# Patient Record
Sex: MALE | Race: WHITE | NOT HISPANIC OR LATINO | Employment: OTHER
[De-identification: names, ages, dates, MRNs, and addresses within clinical notes are randomized per-mention and may not be internally consistent; named-entity substitution may affect disease eponyms.]

---

## 2024-04-11 ENCOUNTER — TRANSCRIBE ORDERS (OUTPATIENT)
Dept: OTHER | Age: 74
End: 2024-04-11

## 2024-04-11 DIAGNOSIS — N40.1 BENIGN PROSTATIC HYPERPLASIA WITH LOWER URINARY TRACT SYMPTOMS: Primary | ICD-10-CM

## 2024-05-07 ENCOUNTER — OFFICE VISIT (OUTPATIENT)
Dept: UROLOGY | Facility: CLINIC | Age: 74
End: 2024-05-07
Payer: COMMERCIAL

## 2024-05-07 VITALS
BODY MASS INDEX: 39.2 KG/M2 | OXYGEN SATURATION: 94 % | HEIGHT: 71 IN | DIASTOLIC BLOOD PRESSURE: 100 MMHG | SYSTOLIC BLOOD PRESSURE: 146 MMHG | WEIGHT: 280 LBS | HEART RATE: 83 BPM

## 2024-05-07 DIAGNOSIS — N13.8 BPH WITH OBSTRUCTION/LOWER URINARY TRACT SYMPTOMS: ICD-10-CM

## 2024-05-07 DIAGNOSIS — N40.1 BPH WITH OBSTRUCTION/LOWER URINARY TRACT SYMPTOMS: ICD-10-CM

## 2024-05-07 DIAGNOSIS — N40.1 BENIGN PROSTATIC HYPERPLASIA WITH LOWER URINARY TRACT SYMPTOMS, SYMPTOM DETAILS UNSPECIFIED: Primary | ICD-10-CM

## 2024-05-07 LAB — RESIDUAL VOLUME (RV) (EXTERNAL): 32

## 2024-05-07 PROCEDURE — 87088 URINE BACTERIA CULTURE: CPT | Performed by: UROLOGY

## 2024-05-07 PROCEDURE — 51798 US URINE CAPACITY MEASURE: CPT | Performed by: UROLOGY

## 2024-05-07 PROCEDURE — 87086 URINE CULTURE/COLONY COUNT: CPT | Performed by: UROLOGY

## 2024-05-07 PROCEDURE — 99204 OFFICE O/P NEW MOD 45 MIN: CPT | Mod: 25 | Performed by: UROLOGY

## 2024-05-07 RX ORDER — OXYBUTYNIN CHLORIDE 5 MG/1
5 TABLET ORAL
COMMUNITY
Start: 2023-05-25 | End: 2024-05-07

## 2024-05-07 RX ORDER — ROPINIROLE 0.5 MG/1
0.5 TABLET, FILM COATED ORAL
COMMUNITY
Start: 2023-05-18

## 2024-05-07 RX ORDER — TERAZOSIN 10 MG/1
10 CAPSULE ORAL
COMMUNITY
Start: 2023-05-18 | End: 2024-05-07

## 2024-05-07 RX ORDER — LEVOTHYROXINE SODIUM 112 UG/1
224 TABLET ORAL
COMMUNITY
Start: 2024-04-11

## 2024-05-07 RX ORDER — METHOCARBAMOL 750 MG/1
750 TABLET, FILM COATED ORAL
COMMUNITY
Start: 2024-01-05 | End: 2024-05-07

## 2024-05-07 RX ORDER — ACETAMINOPHEN 500 MG
1000 TABLET ORAL
COMMUNITY
Start: 2024-04-13

## 2024-05-07 NOTE — NURSING NOTE
Chief Complaint   Patient presents with    Benign Prostatic Hypertrophy     AND LUTS    Patients post void residual was 32 ml today.  Berenice George LPN

## 2024-05-07 NOTE — LETTER
5/7/2024       RE: Stalin Fleming  [address] [facility] [address] [facility] [id number]     Dear Colleague,    Thank you for referring your patient, Stalin Fleming, to the [facility] at [facility]. Please see a copy of my visit note below.    Name: Stalin Fleming   MRN: [id number]  YOB: 1950    Assessment & Plan    Assessment and Plan:  73 year old male with BPH and bothersome lower urinary tract symptoms.     1. Benign prostatic hyperplasia with lower urinary tract symptoms  2. Bladder diverticulum    Discussed BPH treatment options in general are, including medications   Minimally invasive surgical techniques, PAE and HoLEP.  With each of these, discussed benefits, side effects, treatment/retreatment rates with regard to patients prostate symptoms and size.  For him specifically, he would be a candidate for PAE or HoLEP.    Discussed risks of HoLEP, including, but not limited to bleeding, infection, retrograde ejaculation, short/long term incontinence risks, delayed resolution of LUTS and injury to urethra and bladder.  Also discussed hospital stay and follow up.    Discussed using botulinum toxin to help with postoperative urgency and urge incontinence.  Discussed small risk of prolonged urinary retention and slight increase risk of UTI.    Discussed bladder diverticulum.  Based on size, there is a 90% chance that this will not require further management after outlet procedure.  Will observe this going forward.     Plan:  - 120 min Holep with botox with overnight stay  - speciated urine culture  - preop anesthesia risk assessment    30 minutes spent on day of encounter with patient, counseling, documenation, orders and coordination of care.      Efe Kumar MD  May 7, 2024          History of Present Illness      Chief Complaint: BPH and urinary retention     History of Present Illness:  Stalin Fleming is a 73 year old male seen in  consultation from [facility] regarding BPH and urinary retention.      Patient has had symptoms for about 5 years.  Currently, they are voiding spontaneously. They are currently taking an alpha blocker, not taking 5-alpha reductase inhibitor, not taking bladder spasm medication, not taking PDE5 inhibitor for their urination.  Patient is not anticoagulated.    Has some urge urinary incontinence.    Tried sacral nerve stimulator a year ago but did not have success.    Prostate size 130    Outside cystoscopy on February 5, 2024 with trilobar hypertrophy with an intravesical protruding median lobe, Large bladder diverticuli seen, Significant trabeculation    BPH related history:  -- Urinary tract infections  -- Urinary tract stones  -- Gross hematuria  + Elevated PSA  -- History of Urinary retention  + Chronic kidney disease  -- Prior BPH procedure  + Prior Prostate biopsy negative biopsy December 2020  -- History of prostate cancer  -- Family history of BPH    I reviewed external records which in summarized above.    Results      I reviewed internal labs, of which pertinent ones include:   PSA on December 21, 2023 7.55  Creatinine October 26, 2023 0.7  Hemoglobin A1c March 29, 2024 6.3  Platelets 167  Hematocrit 46.8    Cystogram January 2024 demonstrates 4 cm right bladder wall diverticulum with a greater than 1 cm diverticular neck diameter         Past Medical History:  Type 2 diabetes mellitus  COPD  Obesity  Hyperlipidemia  Restless leg  Thrombocytopenia  History of papillary thyroid cancer  Prior smoker  Nephrolithiasis  Elevated PSA  Lung nodules         Past Surgical History:  Past Surgical History:   Procedure Laterality Date    CYSTOSCOPY     Subtotal thyroidectomy  Right upper lobe VATS wedge resection 2017         Social History:  Social History     Tobacco Use    Smoking status: Every Day     Types: Cigarettes    Smokeless tobacco: Never   Substance Use Topics    Alcohol use: Not Currently    Drug use:  "Never            Family History:  History reviewed. No pertinent family history.         Allergies:  No Known Allergies         Medications:  Current Outpatient Medications   Medication Sig Dispense Refill    acetaminophen (TYLENOL) 500 MG tablet 1,000 mg      levothyroxine (SYNTHROID) 112 MCG tablet 224 mcg      rOPINIRole (REQUIP) 0.5 MG tablet 0.5 mg       No current facility-administered medications for this visit.       Review of Systems:   ROS: 10 point ROS neg other than the symptoms noted above in the HPI.    Physical Exam      Physical Exam:  B/P: 146/100, T: Data Unavailable, P: 83, R: Data Unavailable  Estimated body mass index is 39.05 kg/m  as calculated from the following:    Height as of this encounter: 1.803 m (5' 11\").    Weight as of this encounter: 127 kg (280 lb).  General: age-appropriate appearing male in NAD.    Outside records:   I spent 15 minutes reviewing outside records.    "

## 2024-05-07 NOTE — PATIENT INSTRUCTIONS
For holmium laser enucleation of the prostate (HoLEP), this uses a laser to help physically remove the inner prostate tissue from the shell.  This has approximately an 99% success rate after the procedure and a 1-2% retreatment rate in 5 years.  There is a high chance of short term incontinence for 4-6 weeks that may require pads or diapers.  At 3 months, approximately 5% of men are still having leakage and at 6 months and beyond it is 1-2%.  This procedure will take 1-3 hours under general anesthesia depending on the size of the prostate.  Patients can possibly go home the same day without a catheter or stay the night in the hospital if they prefer. Patients should expect to have retrograde ejaculation after surgery (no semen coming out of the end of the penis).    After surgery you should avoid any heavy lifting (more than 20 pounds) for 2 weeks.  You should also avoid sexual activity and any activities that sit on a seat or saddle (like a bike, lawnmower, ATV, motorcycle) for 2 weeks.  Some experience pain with urination for up to a week or 2.  It is normal to have blood in the urine for up to 4 weeks.  I typically see patients back 3 months after surgery with repeat PSA.    You will receive a call from my  within the next 1 to 2 weeks in order to get the procedure scheduled.  You will need a urine culture performed at least 2 weeks prior to surgery and we will prescribe antibiotics if necessary.  You will also need a preoperative physical from her primary care provider or through [facility] at least a week before surgery.  You do not need to hold baby aspirin prior to the surgery, and you will receive instructions on other blood thinners and instructions to hold those as needed.

## 2024-05-07 NOTE — PROGRESS NOTES
Name: Stalin Fleming   MRN: [id number]  YOB: 1950    Assessment & Plan    Assessment and Plan:  73 year old male with BPH and bothersome lower urinary tract symptoms.     1. Benign prostatic hyperplasia with lower urinary tract symptoms  2. Bladder diverticulum    Discussed BPH treatment options in general are, including medications   Minimally invasive surgical techniques, PAE and HoLEP.  With each of these, discussed benefits, side effects, treatment/retreatment rates with regard to patients prostate symptoms and size.  For him specifically, he would be a candidate for PAE or HoLEP.    Discussed risks of HoLEP, including, but not limited to bleeding, infection, retrograde ejaculation, short/long term incontinence risks, delayed resolution of LUTS and injury to urethra and bladder.  Also discussed hospital stay and follow up.    Discussed using botulinum toxin to help with postoperative urgency and urge incontinence.  Discussed small risk of prolonged urinary retention and slight increase risk of UTI.    Discussed bladder diverticulum.  Based on size, there is a 90% chance that this will not require further management after outlet procedure.  Will observe this going forward.     Plan:  - 120 min Holep with botox with overnight stay  - speciated urine culture  - preop anesthesia risk assessment    30 minutes spent on day of encounter with patient, counseling, documenation, orders and coordination of care.      Efe Kumar MD  May 7, 2024          History of Present Illness      Chief Complaint: BPH and urinary retention     History of Present Illness:  Stalin Fleming is a 73 year old male seen in consultation from [facility] regarding BPH and urinary retention.      Patient has had symptoms for about 5 years.  Currently, they are voiding spontaneously. They are currently taking an alpha blocker, not taking 5-alpha reductase inhibitor, not taking bladder spasm medication, not taking PDE5  inhibitor for their urination.  Patient is not anticoagulated.    Has some urge urinary incontinence.    Tried sacral nerve stimulator a year ago but did not have success.    Prostate size 130    Outside cystoscopy on February 5, 2024 with trilobar hypertrophy with an intravesical protruding median lobe, Large bladder diverticuli seen, Significant trabeculation    BPH related history:  -- Urinary tract infections  -- Urinary tract stones  -- Gross hematuria  + Elevated PSA  -- History of Urinary retention  + Chronic kidney disease  -- Prior BPH procedure  + Prior Prostate biopsy negative biopsy December 2020  -- History of prostate cancer  -- Family history of BPH    I reviewed external records which in summarized above.    Results      I reviewed internal labs, of which pertinent ones include:   PSA on December 21, 2023 7.55  Creatinine October 26, 2023 0.7  Hemoglobin A1c March 29, 2024 6.3  Platelets 167  Hematocrit 46.8    Cystogram January 2024 demonstrates 4 cm right bladder wall diverticulum with a greater than 1 cm diverticular neck diameter         Past Medical History:  Type 2 diabetes mellitus  COPD  Obesity  Hyperlipidemia  Restless leg  Thrombocytopenia  History of papillary thyroid cancer  Prior smoker  Nephrolithiasis  Elevated PSA  Lung nodules         Past Surgical History:  Past Surgical History:   Procedure Laterality Date    CYSTOSCOPY     Subtotal thyroidectomy  Right upper lobe VATS wedge resection 2017         Social History:  Social History     Tobacco Use    Smoking status: Every Day     Types: Cigarettes    Smokeless tobacco: Never   Substance Use Topics    Alcohol use: Not Currently    Drug use: Never            Family History:  History reviewed. No pertinent family history.         Allergies:  No Known Allergies         Medications:  Current Outpatient Medications   Medication Sig Dispense Refill    acetaminophen (TYLENOL) 500 MG tablet 1,000 mg      levothyroxine (SYNTHROID) 112 MCG  "tablet 224 mcg      rOPINIRole (REQUIP) 0.5 MG tablet 0.5 mg       No current facility-administered medications for this visit.       Review of Systems:   ROS: 10 point ROS neg other than the symptoms noted above in the HPI.    Physical Exam      Physical Exam:  B/P: 146/100, T: Data Unavailable, P: 83, R: Data Unavailable  Estimated body mass index is 39.05 kg/m  as calculated from the following:    Height as of this encounter: 1.803 m (5' 11\").    Weight as of this encounter: 127 kg (280 lb).  General: age-appropriate appearing male in NAD.    Outside records:   I spent 15 minutes reviewing outside records.      "

## 2024-05-08 ENCOUNTER — TELEPHONE (OUTPATIENT)
Dept: UROLOGY | Facility: CLINIC | Age: 74
End: 2024-05-08

## 2024-05-08 NOTE — TELEPHONE ENCOUNTER
DUNG    Spoke with: Patient       Date of surgery: Wednesday June 5 2024 with Dr Kumar       Location: [facility]       Informed patient they will need a adult : 23 hr obs      Pre op with provider: Patient will schedule with [facility]      H&P Scheduled in PAC- NA        Pre procedure covid :Not req      Additional imaging: NA        Surgery Packet :mailed to patient      Additional comments: Please call for surgery teaching

## 2024-05-09 LAB
BACTERIA UR CULT: ABNORMAL
BACTERIA UR CULT: ABNORMAL

## 2024-05-13 ENCOUNTER — PATIENT OUTREACH (OUTPATIENT)
Dept: UROLOGY | Facility: CLINIC | Age: 74
End: 2024-05-13

## 2024-05-13 DIAGNOSIS — N39.0 URINARY TRACT INFECTION: Primary | ICD-10-CM

## 2024-05-13 RX ORDER — CEPHALEXIN 500 MG/1
500 CAPSULE ORAL 2 TIMES DAILY
Qty: 28 CAPSULE | Refills: 0 | Status: SHIPPED | OUTPATIENT
Start: 2024-05-13 | End: 2024-05-27

## 2024-05-13 NOTE — PROGRESS NOTES
RNCC called pt and lm with call back for pharmacy for abx and surgery teaching.  Pt will need cephalexin 500 mg PO BID x 7 day before/after HoLEP   GERSON Salgado  Care Coordinator Urology  [phone]

## 2024-05-13 NOTE — PROGRESS NOTES
Procedure:   Holmium laser nucleation of the prostate and N/A General   cystoscopic injection of 100 units of Botox         Date: 06/050/2024  Provider: Stacy     Post op appt: tbd    [facility]  UA/UC: completed and tx sent    Medications: yes  Soap: yes  Reviewed when to start clear liquids and when to start NPO: yes  : yes  24 hour observation: yes    Pt or family member expressed understanding: yes    Naomi Wood RN  5/13/2024  3:36 PM

## 2024-06-03 RX ORDER — LOSARTAN POTASSIUM 50 MG/1
1 TABLET ORAL EVERY EVENING
COMMUNITY
Start: 2024-05-17

## 2024-06-04 RX ORDER — METHOCARBAMOL 750 MG/1
750 TABLET, FILM COATED ORAL 3 TIMES DAILY
COMMUNITY

## 2024-06-04 RX ORDER — OXYBUTYNIN CHLORIDE 5 MG/1
5 TABLET ORAL 2 TIMES DAILY
COMMUNITY

## 2024-06-04 RX ORDER — TERAZOSIN 10 MG/1
10 CAPSULE ORAL 2 TIMES DAILY
COMMUNITY

## 2024-06-04 RX ORDER — DULOXETIN HYDROCHLORIDE 30 MG/1
30 CAPSULE, DELAYED RELEASE ORAL DAILY
COMMUNITY

## 2024-06-04 RX ORDER — BUPROPION HYDROCHLORIDE 150 MG/1
150 TABLET, EXTENDED RELEASE ORAL 2 TIMES DAILY
COMMUNITY
Start: 2024-05-17

## 2024-06-05 ENCOUNTER — ANESTHESIA (OUTPATIENT)
Dept: SURGERY | Facility: CLINIC | Age: 74
End: 2024-06-05
Payer: COMMERCIAL

## 2024-06-05 ENCOUNTER — ANESTHESIA EVENT (OUTPATIENT)
Dept: SURGERY | Facility: CLINIC | Age: 74
End: 2024-06-05
Payer: COMMERCIAL

## 2024-06-05 ENCOUNTER — HOSPITAL ENCOUNTER (OUTPATIENT)
Facility: CLINIC | Age: 74
Discharge: HOME | End: 2024-06-06
Attending: UROLOGY | Admitting: UROLOGY
Payer: COMMERCIAL

## 2024-06-05 DIAGNOSIS — N40.0 BENIGN PROSTATIC HYPERPLASIA, UNSPECIFIED WHETHER LOWER URINARY TRACT SYMPTOMS PRESENT: Primary | ICD-10-CM

## 2024-06-05 LAB
ATRIAL RATE - MUSE: 86 BPM
CREAT SERPL-MCNC: 0.68 MG/DL (ref 0.67–1.17)
DIASTOLIC BLOOD PRESSURE - MUSE: NORMAL MMHG
EGFRCR SERPLBLD CKD-EPI 2021: >90 ML/MIN/1.73M2
GLUCOSE BLDC GLUCOMTR-MCNC: 128 MG/DL (ref 70–99)
INTERPRETATION ECG - MUSE: NORMAL
P AXIS - MUSE: 76 DEGREES
PR INTERVAL - MUSE: 170 MS
QRS DURATION - MUSE: 100 MS
QT - MUSE: 406 MS
QTC - MUSE: 485 MS
R AXIS - MUSE: 30 DEGREES
SYSTOLIC BLOOD PRESSURE - MUSE: NORMAL MMHG
T AXIS - MUSE: 32 DEGREES
VENTRICULAR RATE- MUSE: 86 BPM

## 2024-06-05 PROCEDURE — 250N000009 HC RX 250: Performed by: NURSE ANESTHETIST, CERTIFIED REGISTERED

## 2024-06-05 PROCEDURE — 94640 AIRWAY INHALATION TREATMENT: CPT

## 2024-06-05 PROCEDURE — 272N000001 HC OR GENERAL SUPPLY STERILE: Performed by: UROLOGY

## 2024-06-05 PROCEDURE — C1758 CATHETER, URETERAL: HCPCS | Performed by: UROLOGY

## 2024-06-05 PROCEDURE — 52287 CYSTOSCOPY CHEMODENERVATION: CPT | Mod: 59 | Performed by: UROLOGY

## 2024-06-05 PROCEDURE — 999N000141 HC STATISTIC PRE-PROCEDURE NURSING ASSESSMENT: Performed by: UROLOGY

## 2024-06-05 PROCEDURE — 88305 TISSUE EXAM BY PATHOLOGIST: CPT | Mod: 26 | Performed by: PATHOLOGY

## 2024-06-05 PROCEDURE — 258N000001 HC RX 258

## 2024-06-05 PROCEDURE — 250N000025 HC SEVOFLURANE, PER MIN: Performed by: UROLOGY

## 2024-06-05 PROCEDURE — 250N000011 HC RX IP 250 OP 636

## 2024-06-05 PROCEDURE — 999N000157 HC STATISTIC RCP TIME EA 10 MIN

## 2024-06-05 PROCEDURE — 99100 ANES PT EXTEME AGE<1 YR&>70: CPT | Performed by: NURSE ANESTHETIST, CERTIFIED REGISTERED

## 2024-06-05 PROCEDURE — 250N000011 HC RX IP 250 OP 636: Performed by: UROLOGY

## 2024-06-05 PROCEDURE — 250N000009 HC RX 250

## 2024-06-05 PROCEDURE — 999N000054 HC STATISTIC EKG NON-CHARGEABLE

## 2024-06-05 PROCEDURE — 258N000003 HC RX IP 258 OP 636: Performed by: UROLOGY

## 2024-06-05 PROCEDURE — 82962 GLUCOSE BLOOD TEST: CPT

## 2024-06-05 PROCEDURE — 258N000003 HC RX IP 258 OP 636: Performed by: NURSE ANESTHETIST, CERTIFIED REGISTERED

## 2024-06-05 PROCEDURE — 250N000013 HC RX MED GY IP 250 OP 250 PS 637: Performed by: NURSE ANESTHETIST, CERTIFIED REGISTERED

## 2024-06-05 PROCEDURE — 250N000011 HC RX IP 250 OP 636: Performed by: NURSE ANESTHETIST, CERTIFIED REGISTERED

## 2024-06-05 PROCEDURE — 258N000003 HC RX IP 258 OP 636

## 2024-06-05 PROCEDURE — 360N000077 HC SURGERY LEVEL 4, PER MIN: Performed by: UROLOGY

## 2024-06-05 PROCEDURE — 88305 TISSUE EXAM BY PATHOLOGIST: CPT | Mod: TC | Performed by: UROLOGY

## 2024-06-05 PROCEDURE — 52649 PROSTATE LASER ENUCLEATION: CPT | Mod: 22 | Performed by: UROLOGY

## 2024-06-05 PROCEDURE — 250N000013 HC RX MED GY IP 250 OP 250 PS 637

## 2024-06-05 PROCEDURE — 370N000017 HC ANESTHESIA TECHNICAL FEE, PER MIN: Performed by: UROLOGY

## 2024-06-05 PROCEDURE — 99100 ANES PT EXTEME AGE<1 YR&>70: CPT | Performed by: ANESTHESIOLOGY

## 2024-06-05 PROCEDURE — 52649 PROSTATE LASER ENUCLEATION: CPT | Performed by: NURSE ANESTHETIST, CERTIFIED REGISTERED

## 2024-06-05 PROCEDURE — 82565 ASSAY OF CREATININE: CPT | Performed by: UROLOGY

## 2024-06-05 PROCEDURE — 272N000002 HC OR SUPPLY OTHER OPNP: Performed by: UROLOGY

## 2024-06-05 PROCEDURE — 250N000020 HC RX IP 250 OP 636 J0585: Mod: JZ | Performed by: UROLOGY

## 2024-06-05 PROCEDURE — 250N000009 HC RX 250: Performed by: ANESTHESIOLOGY

## 2024-06-05 PROCEDURE — 36415 COLL VENOUS BLD VENIPUNCTURE: CPT | Performed by: UROLOGY

## 2024-06-05 PROCEDURE — 710N000010 HC RECOVERY PHASE 1, LEVEL 2, PER MIN: Performed by: UROLOGY

## 2024-06-05 PROCEDURE — 52649 PROSTATE LASER ENUCLEATION: CPT | Performed by: ANESTHESIOLOGY

## 2024-06-05 RX ORDER — LOSARTAN POTASSIUM 50 MG/1
50 TABLET ORAL EVERY EVENING
Status: DISCONTINUED | OUTPATIENT
Start: 2024-06-05 | End: 2024-06-06 | Stop reason: HOSPADM

## 2024-06-05 RX ORDER — AMOXICILLIN 250 MG
1 CAPSULE ORAL 2 TIMES DAILY
Status: DISCONTINUED | OUTPATIENT
Start: 2024-06-05 | End: 2024-06-06 | Stop reason: HOSPADM

## 2024-06-05 RX ORDER — LIDOCAINE HYDROCHLORIDE 20 MG/ML
INJECTION, SOLUTION INFILTRATION; PERINEURAL PRN
Status: DISCONTINUED | OUTPATIENT
Start: 2024-06-05 | End: 2024-06-05

## 2024-06-05 RX ORDER — FUROSEMIDE 10 MG/ML
20 INJECTION INTRAMUSCULAR; INTRAVENOUS ONCE
Status: DISCONTINUED | OUTPATIENT
Start: 2024-06-05 | End: 2024-06-06 | Stop reason: HOSPADM

## 2024-06-05 RX ORDER — FENTANYL CITRATE 50 UG/ML
25 INJECTION, SOLUTION INTRAMUSCULAR; INTRAVENOUS EVERY 5 MIN PRN
Status: DISCONTINUED | OUTPATIENT
Start: 2024-06-05 | End: 2024-06-05 | Stop reason: HOSPADM

## 2024-06-05 RX ORDER — EPHEDRINE SULFATE 50 MG/ML
INJECTION, SOLUTION INTRAMUSCULAR; INTRAVENOUS; SUBCUTANEOUS PRN
Status: DISCONTINUED | OUTPATIENT
Start: 2024-06-05 | End: 2024-06-05

## 2024-06-05 RX ORDER — NALOXONE HYDROCHLORIDE 0.4 MG/ML
0.2 INJECTION, SOLUTION INTRAMUSCULAR; INTRAVENOUS; SUBCUTANEOUS
Status: DISCONTINUED | OUTPATIENT
Start: 2024-06-05 | End: 2024-06-06 | Stop reason: HOSPADM

## 2024-06-05 RX ORDER — ROPINIROLE 0.25 MG/1
0.5 TABLET, FILM COATED ORAL 3 TIMES DAILY
Status: DISCONTINUED | OUTPATIENT
Start: 2024-06-05 | End: 2024-06-06 | Stop reason: HOSPADM

## 2024-06-05 RX ORDER — HYDROMORPHONE HYDROCHLORIDE 1 MG/ML
0.2 INJECTION, SOLUTION INTRAMUSCULAR; INTRAVENOUS; SUBCUTANEOUS
Status: DISCONTINUED | OUTPATIENT
Start: 2024-06-05 | End: 2024-06-06 | Stop reason: HOSPADM

## 2024-06-05 RX ORDER — AMPICILLIN 2 G/1
2 INJECTION, POWDER, FOR SOLUTION INTRAVENOUS EVERY 6 HOURS
Status: DISCONTINUED | OUTPATIENT
Start: 2024-06-05 | End: 2024-06-06 | Stop reason: HOSPADM

## 2024-06-05 RX ORDER — NALOXONE HYDROCHLORIDE 0.4 MG/ML
0.4 INJECTION, SOLUTION INTRAMUSCULAR; INTRAVENOUS; SUBCUTANEOUS
Status: DISCONTINUED | OUTPATIENT
Start: 2024-06-05 | End: 2024-06-06 | Stop reason: HOSPADM

## 2024-06-05 RX ORDER — SODIUM CHLORIDE, SODIUM LACTATE, POTASSIUM CHLORIDE, CALCIUM CHLORIDE 600; 310; 30; 20 MG/100ML; MG/100ML; MG/100ML; MG/100ML
INJECTION, SOLUTION INTRAVENOUS CONTINUOUS PRN
Status: DISCONTINUED | OUTPATIENT
Start: 2024-06-05 | End: 2024-06-05

## 2024-06-05 RX ORDER — POLYETHYLENE GLYCOL 3350 17 G/17G
17 POWDER, FOR SOLUTION ORAL DAILY
Status: DISCONTINUED | OUTPATIENT
Start: 2024-06-06 | End: 2024-06-06 | Stop reason: HOSPADM

## 2024-06-05 RX ORDER — TAMSULOSIN HYDROCHLORIDE 0.4 MG/1
0.4 CAPSULE ORAL DAILY
Status: DISCONTINUED | OUTPATIENT
Start: 2024-06-05 | End: 2024-06-06 | Stop reason: HOSPADM

## 2024-06-05 RX ORDER — DEXAMETHASONE SODIUM PHOSPHATE 4 MG/ML
4 INJECTION, SOLUTION INTRA-ARTICULAR; INTRALESIONAL; INTRAMUSCULAR; INTRAVENOUS; SOFT TISSUE
Status: DISCONTINUED | OUTPATIENT
Start: 2024-06-05 | End: 2024-06-05 | Stop reason: HOSPADM

## 2024-06-05 RX ORDER — ONDANSETRON 4 MG/1
4 TABLET, ORALLY DISINTEGRATING ORAL EVERY 6 HOURS PRN
Status: DISCONTINUED | OUTPATIENT
Start: 2024-06-05 | End: 2024-06-06 | Stop reason: HOSPADM

## 2024-06-05 RX ORDER — SODIUM CHLORIDE 9 MG/ML
INJECTION, SOLUTION INTRAVENOUS CONTINUOUS
Status: ACTIVE | OUTPATIENT
Start: 2024-06-05 | End: 2024-06-05

## 2024-06-05 RX ORDER — HYDROMORPHONE HYDROCHLORIDE 1 MG/ML
0.4 INJECTION, SOLUTION INTRAMUSCULAR; INTRAVENOUS; SUBCUTANEOUS
Status: DISCONTINUED | OUTPATIENT
Start: 2024-06-05 | End: 2024-06-06 | Stop reason: HOSPADM

## 2024-06-05 RX ORDER — OXYCODONE HYDROCHLORIDE 5 MG/1
10 TABLET ORAL EVERY 4 HOURS PRN
Status: DISCONTINUED | OUTPATIENT
Start: 2024-06-05 | End: 2024-06-06 | Stop reason: HOSPADM

## 2024-06-05 RX ORDER — PROCHLORPERAZINE MALEATE 5 MG
5 TABLET ORAL EVERY 6 HOURS PRN
Status: DISCONTINUED | OUTPATIENT
Start: 2024-06-05 | End: 2024-06-06 | Stop reason: HOSPADM

## 2024-06-05 RX ORDER — METHOCARBAMOL 750 MG/1
750 TABLET, FILM COATED ORAL 3 TIMES DAILY
Status: DISCONTINUED | OUTPATIENT
Start: 2024-06-05 | End: 2024-06-06 | Stop reason: HOSPADM

## 2024-06-05 RX ORDER — AMPICILLIN 2 G/1
2 INJECTION, POWDER, FOR SOLUTION INTRAVENOUS ONCE
Status: COMPLETED | OUTPATIENT
Start: 2024-06-05 | End: 2024-06-05

## 2024-06-05 RX ORDER — LIDOCAINE 40 MG/G
CREAM TOPICAL
Status: DISCONTINUED | OUTPATIENT
Start: 2024-06-05 | End: 2024-06-06 | Stop reason: HOSPADM

## 2024-06-05 RX ORDER — GINSENG 100 MG
CAPSULE ORAL 3 TIMES DAILY
Status: DISCONTINUED | OUTPATIENT
Start: 2024-06-05 | End: 2024-06-06 | Stop reason: HOSPADM

## 2024-06-05 RX ORDER — IPRATROPIUM BROMIDE AND ALBUTEROL SULFATE 2.5; .5 MG/3ML; MG/3ML
3 SOLUTION RESPIRATORY (INHALATION)
Status: DISCONTINUED | OUTPATIENT
Start: 2024-06-05 | End: 2024-06-06 | Stop reason: HOSPADM

## 2024-06-05 RX ORDER — NALOXONE HYDROCHLORIDE 0.4 MG/ML
0.1 INJECTION, SOLUTION INTRAMUSCULAR; INTRAVENOUS; SUBCUTANEOUS
Status: DISCONTINUED | OUTPATIENT
Start: 2024-06-05 | End: 2024-06-05 | Stop reason: HOSPADM

## 2024-06-05 RX ORDER — ALBUTEROL SULFATE 90 UG/1
AEROSOL, METERED RESPIRATORY (INHALATION) PRN
Status: DISCONTINUED | OUTPATIENT
Start: 2024-06-05 | End: 2024-06-05

## 2024-06-05 RX ORDER — BUPROPION HYDROCHLORIDE 150 MG/1
150 TABLET, EXTENDED RELEASE ORAL 2 TIMES DAILY
Status: DISCONTINUED | OUTPATIENT
Start: 2024-06-05 | End: 2024-06-06 | Stop reason: HOSPADM

## 2024-06-05 RX ORDER — ACETAMINOPHEN 325 MG/1
975 TABLET ORAL EVERY 8 HOURS
Qty: 27 TABLET | Refills: 0 | Status: DISCONTINUED | OUTPATIENT
Start: 2024-06-05 | End: 2024-06-06 | Stop reason: HOSPADM

## 2024-06-05 RX ORDER — ONDANSETRON 2 MG/ML
4 INJECTION INTRAMUSCULAR; INTRAVENOUS EVERY 6 HOURS PRN
Status: DISCONTINUED | OUTPATIENT
Start: 2024-06-05 | End: 2024-06-06 | Stop reason: HOSPADM

## 2024-06-05 RX ORDER — DEXAMETHASONE SODIUM PHOSPHATE 4 MG/ML
INJECTION, SOLUTION INTRA-ARTICULAR; INTRALESIONAL; INTRAMUSCULAR; INTRAVENOUS; SOFT TISSUE PRN
Status: DISCONTINUED | OUTPATIENT
Start: 2024-06-05 | End: 2024-06-05

## 2024-06-05 RX ORDER — ONDANSETRON 2 MG/ML
4 INJECTION INTRAMUSCULAR; INTRAVENOUS EVERY 30 MIN PRN
Status: DISCONTINUED | OUTPATIENT
Start: 2024-06-05 | End: 2024-06-05 | Stop reason: HOSPADM

## 2024-06-05 RX ORDER — BISACODYL 10 MG
10 SUPPOSITORY, RECTAL RECTAL DAILY PRN
Status: DISCONTINUED | OUTPATIENT
Start: 2024-06-08 | End: 2024-06-06 | Stop reason: HOSPADM

## 2024-06-05 RX ORDER — PROPOFOL 10 MG/ML
INJECTION, EMULSION INTRAVENOUS PRN
Status: DISCONTINUED | OUTPATIENT
Start: 2024-06-05 | End: 2024-06-05

## 2024-06-05 RX ORDER — HYDROMORPHONE HYDROCHLORIDE 1 MG/ML
0.2 INJECTION, SOLUTION INTRAMUSCULAR; INTRAVENOUS; SUBCUTANEOUS EVERY 5 MIN PRN
Status: DISCONTINUED | OUTPATIENT
Start: 2024-06-05 | End: 2024-06-05 | Stop reason: HOSPADM

## 2024-06-05 RX ORDER — DULOXETIN HYDROCHLORIDE 30 MG/1
60 CAPSULE, DELAYED RELEASE ORAL AT BEDTIME
Status: DISCONTINUED | OUTPATIENT
Start: 2024-06-05 | End: 2024-06-06 | Stop reason: HOSPADM

## 2024-06-05 RX ORDER — FENTANYL CITRATE 50 UG/ML
INJECTION, SOLUTION INTRAMUSCULAR; INTRAVENOUS PRN
Status: DISCONTINUED | OUTPATIENT
Start: 2024-06-05 | End: 2024-06-05

## 2024-06-05 RX ORDER — ACETAMINOPHEN 325 MG/1
650 TABLET ORAL EVERY 4 HOURS PRN
Status: DISCONTINUED | OUTPATIENT
Start: 2024-06-08 | End: 2024-06-06 | Stop reason: HOSPADM

## 2024-06-05 RX ORDER — ONDANSETRON 2 MG/ML
INJECTION INTRAMUSCULAR; INTRAVENOUS PRN
Status: DISCONTINUED | OUTPATIENT
Start: 2024-06-05 | End: 2024-06-05

## 2024-06-05 RX ORDER — SODIUM CHLORIDE, SODIUM LACTATE, POTASSIUM CHLORIDE, CALCIUM CHLORIDE 600; 310; 30; 20 MG/100ML; MG/100ML; MG/100ML; MG/100ML
INJECTION, SOLUTION INTRAVENOUS CONTINUOUS
Status: DISCONTINUED | OUTPATIENT
Start: 2024-06-05 | End: 2024-06-05 | Stop reason: HOSPADM

## 2024-06-05 RX ORDER — LEVOTHYROXINE SODIUM 112 UG/1
224 TABLET ORAL DAILY
Status: DISCONTINUED | OUTPATIENT
Start: 2024-06-05 | End: 2024-06-06 | Stop reason: HOSPADM

## 2024-06-05 RX ORDER — OXYCODONE HYDROCHLORIDE 5 MG/1
5 TABLET ORAL EVERY 4 HOURS PRN
Status: DISCONTINUED | OUTPATIENT
Start: 2024-06-05 | End: 2024-06-06 | Stop reason: HOSPADM

## 2024-06-05 RX ORDER — HYDROMORPHONE HYDROCHLORIDE 1 MG/ML
0.4 INJECTION, SOLUTION INTRAMUSCULAR; INTRAVENOUS; SUBCUTANEOUS EVERY 5 MIN PRN
Status: DISCONTINUED | OUTPATIENT
Start: 2024-06-05 | End: 2024-06-05 | Stop reason: HOSPADM

## 2024-06-05 RX ORDER — ONDANSETRON 4 MG/1
4 TABLET, ORALLY DISINTEGRATING ORAL EVERY 30 MIN PRN
Status: DISCONTINUED | OUTPATIENT
Start: 2024-06-05 | End: 2024-06-05 | Stop reason: HOSPADM

## 2024-06-05 RX ORDER — FENTANYL CITRATE 50 UG/ML
50 INJECTION, SOLUTION INTRAMUSCULAR; INTRAVENOUS EVERY 5 MIN PRN
Status: DISCONTINUED | OUTPATIENT
Start: 2024-06-05 | End: 2024-06-05 | Stop reason: HOSPADM

## 2024-06-05 RX ADMIN — EPHEDRINE SULFATE 5 MG: 5 INJECTION INTRAVENOUS at 08:26

## 2024-06-05 RX ADMIN — PHENYLEPHRINE HYDROCHLORIDE 100 MCG: 10 INJECTION INTRAVENOUS at 08:07

## 2024-06-05 RX ADMIN — PHENYLEPHRINE HYDROCHLORIDE 200 MCG: 10 INJECTION INTRAVENOUS at 08:21

## 2024-06-05 RX ADMIN — LOSARTAN POTASSIUM 50 MG: 50 TABLET, FILM COATED ORAL at 20:00

## 2024-06-05 RX ADMIN — ACETAMINOPHEN 975 MG: 325 TABLET, FILM COATED ORAL at 19:59

## 2024-06-05 RX ADMIN — GENTAMICIN SULFATE 480 MG: 40 INJECTION, SOLUTION INTRAMUSCULAR; INTRAVENOUS at 07:49

## 2024-06-05 RX ADMIN — DOCUSATE SODIUM AND SENNOSIDES 1 TABLET: 8.6; 5 TABLET, FILM COATED ORAL at 19:59

## 2024-06-05 RX ADMIN — AMPICILLIN SODIUM 2 G: 2 INJECTION, POWDER, FOR SOLUTION INTRAMUSCULAR; INTRAVENOUS at 19:59

## 2024-06-05 RX ADMIN — IPRATROPIUM BROMIDE AND ALBUTEROL SULFATE 3 ML: .5; 3 SOLUTION RESPIRATORY (INHALATION) at 20:38

## 2024-06-05 RX ADMIN — PHENYLEPHRINE HYDROCHLORIDE 0.4 MCG/KG/MIN: 10 INJECTION INTRAVENOUS at 08:54

## 2024-06-05 RX ADMIN — SODIUM CHLORIDE 3000 ML: 900 IRRIGANT IRRIGATION at 19:22

## 2024-06-05 RX ADMIN — SUGAMMADEX 200 MG: 100 INJECTION, SOLUTION INTRAVENOUS at 10:13

## 2024-06-05 RX ADMIN — ONDANSETRON 4 MG: 2 INJECTION INTRAMUSCULAR; INTRAVENOUS at 10:13

## 2024-06-05 RX ADMIN — SODIUM CHLORIDE, POTASSIUM CHLORIDE, SODIUM LACTATE AND CALCIUM CHLORIDE: 600; 310; 30; 20 INJECTION, SOLUTION INTRAVENOUS at 08:57

## 2024-06-05 RX ADMIN — SODIUM CHLORIDE 3000 ML: 900 IRRIGANT IRRIGATION at 17:37

## 2024-06-05 RX ADMIN — PROPOFOL 170 MG: 10 INJECTION, EMULSION INTRAVENOUS at 07:44

## 2024-06-05 RX ADMIN — BUPROPION HYDROCHLORIDE 150 MG: 150 TABLET, EXTENDED RELEASE ORAL at 12:59

## 2024-06-05 RX ADMIN — FENTANYL CITRATE 100 MCG: 50 INJECTION INTRAMUSCULAR; INTRAVENOUS at 07:44

## 2024-06-05 RX ADMIN — METHOCARBAMOL 750 MG: 750 TABLET ORAL at 14:26

## 2024-06-05 RX ADMIN — LIDOCAINE HYDROCHLORIDE 100 MG: 20 INJECTION, SOLUTION INFILTRATION; PERINEURAL at 07:44

## 2024-06-05 RX ADMIN — PHENYLEPHRINE HYDROCHLORIDE 100 MCG: 10 INJECTION INTRAVENOUS at 08:05

## 2024-06-05 RX ADMIN — BACITRACIN: 500 OINTMENT TOPICAL at 20:05

## 2024-06-05 RX ADMIN — PROPOFOL 50 MG: 10 INJECTION, EMULSION INTRAVENOUS at 08:51

## 2024-06-05 RX ADMIN — ALBUTEROL SULFATE 8 PUFF: 108 INHALANT RESPIRATORY (INHALATION) at 10:00

## 2024-06-05 RX ADMIN — DOCUSATE SODIUM AND SENNOSIDES 1 TABLET: 8.6; 5 TABLET, FILM COATED ORAL at 12:58

## 2024-06-05 RX ADMIN — Medication 60 MG: at 07:44

## 2024-06-05 RX ADMIN — SODIUM CHLORIDE: 9 INJECTION, SOLUTION INTRAVENOUS at 12:59

## 2024-06-05 RX ADMIN — DEXAMETHASONE SODIUM PHOSPHATE 8 MG: 4 INJECTION, SOLUTION INTRA-ARTICULAR; INTRALESIONAL; INTRAMUSCULAR; INTRAVENOUS; SOFT TISSUE at 08:13

## 2024-06-05 RX ADMIN — TAMSULOSIN HYDROCHLORIDE 0.4 MG: 0.4 CAPSULE ORAL at 12:58

## 2024-06-05 RX ADMIN — PHENYLEPHRINE HYDROCHLORIDE 100 MCG: 10 INJECTION INTRAVENOUS at 08:10

## 2024-06-05 RX ADMIN — PHENYLEPHRINE HYDROCHLORIDE 100 MCG: 10 INJECTION INTRAVENOUS at 08:28

## 2024-06-05 RX ADMIN — SODIUM CHLORIDE 3000 ML: 900 IRRIGANT IRRIGATION at 13:43

## 2024-06-05 RX ADMIN — IPRATROPIUM BROMIDE AND ALBUTEROL SULFATE 3 ML: .5; 3 SOLUTION RESPIRATORY (INHALATION) at 14:45

## 2024-06-05 RX ADMIN — BUPROPION HYDROCHLORIDE 150 MG: 150 TABLET, EXTENDED RELEASE ORAL at 20:00

## 2024-06-05 RX ADMIN — PHENYLEPHRINE HYDROCHLORIDE 0.3 MCG/KG/MIN: 10 INJECTION INTRAVENOUS at 08:27

## 2024-06-05 RX ADMIN — SODIUM CHLORIDE, POTASSIUM CHLORIDE, SODIUM LACTATE AND CALCIUM CHLORIDE: 600; 310; 30; 20 INJECTION, SOLUTION INTRAVENOUS at 07:32

## 2024-06-05 RX ADMIN — IPRATROPIUM BROMIDE AND ALBUTEROL SULFATE 3 ML: .5; 3 SOLUTION RESPIRATORY (INHALATION) at 07:02

## 2024-06-05 RX ADMIN — DULOXETINE HYDROCHLORIDE 60 MG: 30 CAPSULE, DELAYED RELEASE ORAL at 21:29

## 2024-06-05 RX ADMIN — EPHEDRINE SULFATE 5 MG: 5 INJECTION INTRAVENOUS at 08:21

## 2024-06-05 RX ADMIN — PHENYLEPHRINE HYDROCHLORIDE 100 MCG: 10 INJECTION INTRAVENOUS at 08:01

## 2024-06-05 RX ADMIN — ACETAMINOPHEN 975 MG: 325 TABLET, FILM COATED ORAL at 12:59

## 2024-06-05 RX ADMIN — LEVOTHYROXINE SODIUM 224 MCG: 112 TABLET ORAL at 14:26

## 2024-06-05 RX ADMIN — PHENYLEPHRINE HYDROCHLORIDE 100 MCG: 10 INJECTION INTRAVENOUS at 08:14

## 2024-06-05 RX ADMIN — PHENYLEPHRINE HYDROCHLORIDE 100 MCG: 10 INJECTION INTRAVENOUS at 09:03

## 2024-06-05 RX ADMIN — PHENYLEPHRINE HYDROCHLORIDE 100 MCG: 10 INJECTION INTRAVENOUS at 08:51

## 2024-06-05 RX ADMIN — ROPINIROLE HYDROCHLORIDE 0.5 MG: 0.25 TABLET, FILM COATED ORAL at 14:26

## 2024-06-05 RX ADMIN — ROPINIROLE HYDROCHLORIDE 0.5 MG: 0.25 TABLET, FILM COATED ORAL at 20:01

## 2024-06-05 RX ADMIN — EPHEDRINE SULFATE 5 MG: 5 INJECTION INTRAVENOUS at 08:58

## 2024-06-05 RX ADMIN — AMPICILLIN SODIUM 2 G: 2 INJECTION, POWDER, FOR SOLUTION INTRAMUSCULAR; INTRAVENOUS at 14:26

## 2024-06-05 RX ADMIN — METHOCARBAMOL 750 MG: 750 TABLET ORAL at 20:00

## 2024-06-05 RX ADMIN — EPHEDRINE SULFATE 5 MG: 5 INJECTION INTRAVENOUS at 09:44

## 2024-06-05 RX ADMIN — AMPICILLIN SODIUM 2 G: 2 INJECTION, POWDER, FOR SOLUTION INTRAMUSCULAR; INTRAVENOUS at 07:10

## 2024-06-05 ASSESSMENT — ACTIVITIES OF DAILY LIVING (ADL)
ADLS_ACUITY_SCORE: 31
ADLS_ACUITY_SCORE: 28
ADLS_ACUITY_SCORE: 28
ADLS_ACUITY_SCORE: 31
ADLS_ACUITY_SCORE: 27
ADLS_ACUITY_SCORE: 33
ADLS_ACUITY_SCORE: 28
ADLS_ACUITY_SCORE: 28
ADLS_ACUITY_SCORE: 31
ADLS_ACUITY_SCORE: 33
ADLS_ACUITY_SCORE: 28
ADLS_ACUITY_SCORE: 29
ADLS_ACUITY_SCORE: 28
ADLS_ACUITY_SCORE: 33

## 2024-06-05 NOTE — PLAN OF CARE
"  [place] [facility]    Stalin Fleming was admitted from [facility] for laser nucleation of prostate.  2 RN skin assessment: completed by Sugey Garcia and Anna Duarte  Result of skin assessment and interventions/actions: skin intact, except for old scattered bruising on BUE.   Height, weight: completed? yes  Patient belongings & admission documents: see Flowsheets, completed? Yes   MDRO education added to care plan: yes       Goal Outcome Evaluation:        Plan of Care Reviewed With: patient    Overall Patient Progress: no changeOverall Patient Progress: no change    Outcome Evaluation: no change in pt progress this shift.    Pt A&Ox4, able to make needs known. Denied chest pain, N/V, N/T. Expiratory wheezing heard and pt is on 3L via capno.. Pt has not gotten up yet. Continent of bowel-LBM 6/3, CBI draining clear color urine- see flowsheet for output. On regular/thin-pills whole. Skin intact. Right PIV infusing NS@ 75ml/hr. No other concerns as of now, continue with POC.     Patient most recent vitals:  /80 (BP Location: Right arm)   Pulse 77   Temp 97.9  F (36.6  C) (Oral)   Resp 24   Ht 1.803 m (5' 11\")   Wt 128 kg (282 lb 3 oz)   SpO2 93%   BMI 39.36 kg/m       "

## 2024-06-05 NOTE — PHARMACY-AMINOGLYCOSIDE DOSING SERVICE
Pharmacy Aminoglycoside Initial Note  Date of Service 2024  Patient's  1950  73 year old, male    Weight (Adjusted):  96.4 kg    Indication: Surgical Prophylaxis    Current estimated CrCl = Estimated Creatinine Clearance: 130 mL/min (based on SCr of 0.68 mg/dL).    Creatinine for last 3 days  2024:  1:07 PM Creatinine 0.68 mg/dL     Nephrotoxins and other renal medications (From now, onward)      Start     Dose/Rate Route Frequency Ordered Stop    24 0800  gentamicin (GARAMYCIN) 480 mg in sodium chloride 0.9 % 50 mL intermittent infusion         5 mg/kg × 96.4 kg (Adjusted)  over 60 Minutes Intravenous EVERY 24 HOURS 24 1345      24 1400  ampicillin (OMNIPEN) 2 g vial to attach to  mL bag         2 g  over 15 Minutes Intravenous EVERY 6 HOURS 24 1037      24 0900  phenylephrine (CELESTINE-SYNEPHRINE) 0.2 mg/mL in sodium chloride 0.9 % 50 mL infusion         0.1-1 mcg/kg/min × 128 kg  3.84-38.4 mL/hr  Intravenous CONTINUOUS 24 0849      24 0500  furosemide (LASIX) injection 20 mg         20 mg  over 1-3 Minutes Intravenous ONCE 24 1032              Contrast Orders - past 72 hours (72h ago, onward)      None            Aminoglycoside Levels - past 2 days  No results found for requested labs within last 2 days.    Aminoglycosides IV Administrations (past 72 hours)                     gentamicin (GARAMYCIN) 480 mg in sodium chloride 0.9 % 50 mL intermittent infusion (mg) 480 mg New Bag 24 0749                        Plan:  1.  Start Gentamicin 480 mg (5 mg/kg) IV q24h.   2.  Target goals based on extended interval dosing  3.  Goal peak level: 17-24 mg/L  4.  Goal trough level: <0.5mg/L  5.  Pharmacy will continue to follow and check levels as appropriate in 1-3 Days      Flakita Varghese [facility]

## 2024-06-05 NOTE — ANESTHESIA PROCEDURE NOTES
Airway       Patient location during procedure: OR       Procedure Start/Stop Times: 6/5/2024 7:48 AM  Staff -        Anesthesiologist:  Ralph Michel DO       CRNA: Dipesh Richardson APRN CRNA       Performed By: CRNA  Consent for Airway        Urgency: elective  Indications and Patient Condition       Indications for airway management: brown-procedural       Induction type:intravenous       Mask difficulty assessment: 2 - vent by mask + OA or adjuvant +/- NMBA    Final Airway Details       Final airway type: endotracheal airway       Successful airway: ETT - single  Endotracheal Airway Details        ETT size (mm): 8.0       Cuffed: yes       Cuff volume (mL): 25       Successful intubation technique: direct laryngoscopy       DL Blade Type: MAC 4       Grade View of Cords: 1       Adjucts: stylet       Position: Right       Measured from: gums/teeth       Secured at (cm): 24       Bite block used: None    Post intubation assessment        Placement verified by: capnometry, equal breath sounds and chest rise        Number of attempts at approach: 1       Number of other approaches attempted: 0       Secured with: commercial tube chowdary       Ease of procedure: easy       Dentition: Intact and Unchanged    Medication(s) Administered   Medication Administration Time: 6/5/2024 7:48 AM

## 2024-06-05 NOTE — BRIEF OP NOTE
[facility]    Brief Operative Note    Pre-operative diagnosis: Benign prostatic hyperplasia with lower urinary tract symptoms, symptom details unspecified [N40.1]  Post-operative diagnosis Same as pre-operative diagnosis    Procedure: Holmium laser nucleation of the prostate and, N/A - Urethra  cystoscopic injection of 100 units of Botox, N/A - Urethra    Surgeon: Surgeons and Role:     * Efe Kumar MD - Primary  Resident: Ignacio Alfonso MD PGY2    Anesthesia: General   Estimated Blood Loss: 30 mL from 6/5/2024  7:36 AM to 6/5/2024 10:24 AM      Drains: 22f de souza catheter to CBI  Specimens:   ID Type Source Tests Collected by Time Destination   1 :  Tissue Prostate SURGICAL PATHOLOGY EXAM Efe Kumar MD 6/5/2024  9:37 AM      Findings:   None.  Complications: None.  Implants: * No implants in log *    POST OP PLAN  Admit obs overnight  Amp/gent while in house  Has home abx on discharge, finish course  Remove de souza POD 1 (okay for nursing to do if urine clear)

## 2024-06-05 NOTE — OP NOTE
Operative Report  6/5/2024    PREOPERATIVE DIAGNOSIS:  Prostatic hypertrophy with lower urinary tract symptoms  POSTOPERATIVE DIAGNOSIS: Same as above    PROCEDURE PERFORMED: Holmium laser enucleation of the prostate (modifier 22 for 30 min additional time for this case compared to normal); cystoscopic botox injection (100 units)  STAFF SURGEON: Efe Kumar MD  ASSISTANT(S): Ignacio Alfonso MD (PGY3)    ANESTHESIA: General  INTRAVENOUS FLUIDS: See anesthesia records  ESTIMATED BLOOD LOSS: 30 ml   SPECIMENS: Prostate adenoma  DRAINS: 22-S[place] Arabian 3-way catheter with 60 ml in balloon     FINDINGS: Approximately 130 gm prostate with bilateral lobe hypertrophy. Bladder with moderate trabeculation  Enucleation time: 78  Morcellation time: 12  Tissue weight: 102  Energy: 187.80    INDICATIONS FOR PROCEDURE: Stalin Fleming is a(n) 73 year old male who was seen in consultation for BPH with obstructive voiding symptoms. He has elected treatment with laser enucleation. Prostate volume estimated to be 130 grams. After discussion of the risks, benefits and alternatives of the procedure, the patient agreed to proceed with the above stated procdure.    DESCRIPTION OF PROCEDURE: After obtaining informed consent, the patient was taken to the operating room and placed under general anesthesia.  He was repositioned in dorsal lithotomy making sure that the legs were positioned and padded safely.  He was then prepped and draped in standard sterile fashion.  Culture directed antibiotics were administered and bilateral sequential compression devices were placed.  A time out was performed confirming the appropriate patient identity and planned procedure.     The urethra was injected with lubricant jelly and was calibrated with sounds from 22 fr to 26fr sequentially in 2 fr increments.  These passed easily, and then 26 fr Beltrán sheath with 22 inner was placed with the obturator.  The bladder was unremarkable.  The ureteral orifices were  orthotopic.  The prostate had Bilobar anatomy with outlet obstruction.    We began enucleation by making an apical incision adjacent to the veromontanum.  We developed the apical plane on the left side and carried this laterally until 3 oclock.  We then proceeded to make a counter incision in the same fashion on the right side.  We next dissected the prostate out in a circumferential fashion, coming over the top of the gland and entering the bladder neck.  We next identified the mucosal strip anteriorly and transected it with the laser.  We then proceeded to take down the remaining lateral and posterior attachments.  There was significant difficulty pushing the adenoma in the bladder and this took 30 min extra time.  I bisected the en bloc tissue and still could not.      I morcellated the top half of the adenoma in the fossa while attached posteriorly.   This allowed both lobes to be flipped into the bladder and each half was released into the bladder.      After obtaining hemostasis, went back to morcellate.  The Villar nephroscope was cracked and switched to Storz 26 fr for mocellation.  After completing morcellation at 6000 hz, went back in with villar scope for final hemostasis and for botox injection of 100 U in 20 aliquots.     We lubricated the urethra again and passed a 22F 3-way d esouza catheter without catheter guide. We filled the balloon with 60  ml of sterile water and did not place the catheter on traction.  The patient was woken from anesthesia and taken to the recovery room in stable condition.     The specimen was weighed and found to be approximately 102 g.     POSTOPERATIVE PLAN:   -We will monitor the patient post operatively on continuous bladder irrigation for signs of ongling bleeding.  If clear we will consider discharge with de souza removal as an outpatient.  If continues to have ongoing bleeding concerning for clot formation without bladder irrigation will plan to admit for observation  -Void  trial in :AM  -Labs CBC/BMP in AM  -Home Antibiotics Keflex 500 BID 1 week    I, Efe Kumar, was present for the entire case on 06/05/24.

## 2024-06-05 NOTE — OR NURSING
"PACU to Inpatient Nursing Handoff    Patient Stalin Fleming is a 73 year old male who speaks English.   Procedure Procedure(s):  Holmium laser nucleation of the prostate and  cystoscopic injection of 100 units of Botox   Surgeon(s) Primary: Efe Kumar MD     No Known Allergies    Isolation  No active isolations     Past Medical History   has no past medical history on file.    Anesthesia General   Dermatome Level     Preop Meds Not applicable   Nerve block Not applicable   Intraop Meds albuterol (Proventil, Ventolin)  dexamethasone (Decadron)  fentanyl (Sublimaze): 100 mcg total  ondansetron (Zofran): last given at 1013  Botox, albuterol puffs   Local Meds No   Antibiotics Gentamicin at 0749 and ampicillin at 0710     Pain Patient Currently in Pain: denies   PACU meds  Not applicable   PCA / epidural No   Capnography     Telemetry ECG Rhythm: Normal sinus rhythm   Inpatient Telemetry Monitor Ordered? No        Labs Glucose Lab Results   Component Value Date     06/05/2024       Hgb No results found for: \"HGB\"    INR No results found for: \"INR\"   PACU Imaging Not applicable     Wound/Incision Incision/Surgical Site 06/05/24 Penis (Active)   Incision Assessment UTV 06/05/24 1026   Incision Drainage Amount Scant 06/05/24 1026   Drainage Description Other (Comment) 06/05/24 1026   Dressing Intervention Open to air / No Dressing 06/05/24 1026   Number of days: 0      CMS        Equipment Not applicable   Other LDA       IV Access Peripheral IV 06/05/24 Right;Posterior Hand (Active)   Site Assessment WDL 06/05/24 1026   Line Status Infusing 06/05/24 1026   Dressing Transparent 06/05/24 1026   Dressing Status clean;dry;intact 06/05/24 1026   Phlebitis Scale 0-->no symptoms 06/05/24 1026   Infiltration? no 06/05/24 1026   Number of days: 0      Blood Products Not applicable EBL 30 mL   Intake/Output Date 06/05/24 0700 - 06/06/24 0659   Shift 4626-6258 3956-9399 3448-3492 24 Hour Total   INTAKE   I.V. 1300   " 1300   Shift Total(mL/kg) 1300(10.16)   1300(10.16)   OUTPUT   Urine 1300   1300   Blood 30   30   Shift Total(mL/kg) 1330(10.39)   1330(10.39)   Weight (kg) 128 128 128 128      Drains / Rodriguez Urethral Catheter 06/05/24 Latex 22 fr (Active)   Collection Container Standard 06/05/24 1026   Securement Method Securing device (Describe) 06/05/24 1026   Rationale for Continued Use Surgical procedure 06/05/24 1026   Number of days: 0      Time of void PreOp Time of Void Prior to Procedure: 0400 (06/05/24 0626)    PostOp      Diapered? No   Bladder Scan     PO    tolerating sips, water, and coffee     Vitals    B/P: 107/74  T: 97.5  F (36.4  C)    Temp src: Oral  P:  Pulse: 83 (06/05/24 1115)          R: 21  O2:  SpO2: 93 %    O2 Device: Nasal cannula (06/05/24 1100)    Oxygen Delivery: 4 LPM (06/05/24 1100)         Family/support present significant other   Patient belongings  Yes     Patient transported on cart   DC meds/scripts (obs/outpt) Not applicable   Inpatient Pain Meds Released? Yes       Special needs/considerations None   Tasks needing completion None       GERSON Ferrara

## 2024-06-05 NOTE — ANESTHESIA POSTPROCEDURE EVALUATION
Patient: Stalin Fleming    Procedure: Procedure(s):  Holmium laser nucleation of the prostate and  cystoscopic injection of 100 units of Botox       Anesthesia Type:  General    Note:  Disposition: Inpatient   Postop Pain Control: Uneventful            Sign Out: Well controlled pain   PONV: No   Neuro/Psych: Uneventful            Sign Out: Acceptable/Baseline neuro status   Airway/Respiratory: Uneventful            Sign Out: Acceptable/Baseline resp. status   CV/Hemodynamics: Uneventful            Sign Out: Acceptable CV status; No obvious hypovolemia; No obvious fluid overload   Other NRE: NONE   DID A NON-ROUTINE EVENT OCCUR? No           Last vitals:  Vitals Value Taken Time   /78 06/05/24 1045   Temp     Pulse 92 06/05/24 1055   Resp 30 06/05/24 1055   SpO2 90 % 06/05/24 1055   Vitals shown include unfiled device data.    Electronically Signed By: Ralph Michel DO  June 5, 2024  10:56 AM

## 2024-06-05 NOTE — ANESTHESIA PREPROCEDURE EVALUATION
"Anesthesia Pre-Procedure Evaluation    Patient: Stalin Fleming   MRN: [id number] : 1950        Procedure : Procedure(s):  Holmium laser nucleation of the prostate and  cystoscopic injection of 100 units of Botox          History reviewed. No pertinent past medical history.   Past Surgical History:   Procedure Laterality Date    CYSTOSCOPY        No Known Allergies   Social History     Tobacco Use    Smoking status: Every Day     Types: Cigarettes    Smokeless tobacco: Never   Substance Use Topics    Alcohol use: Not Currently      Wt Readings from Last 1 Encounters:   24 128 kg (282 lb 3 oz)        Anesthesia Evaluation            ROS/MED HX  ENT/Pulmonary:       Neurologic:       Cardiovascular:     (+)  hypertension- -   -  - -                                      METS/Exercise Tolerance:     Hematologic:       Musculoskeletal:       GI/Hepatic:       Renal/Genitourinary:       Endo:     (+)          thyroid problem,            Psychiatric/Substance Use:     (+) psychiatric history        Infectious Disease:       Malignancy:       Other:            Physical Exam    Airway        Mallampati: I   TM distance: > 3 FB   Neck ROM: limited   Mouth opening: > 3 cm    Respiratory Devices and Support         Dental       (+) Edentulous      Cardiovascular   cardiovascular exam normal       Rhythm and rate: regular     Pulmonary           (+) decreased breath sounds and wheezes           OUTSIDE LABS:  CBC: No results found for: \"WBC\", \"HGB\", \"HCT\", \"PLT\"  BMP: No results found for: \"NA\", \"POTASSIUM\", \"CHLORIDE\", \"CO2\", \"BUN\", \"CR\", \"GLC\"  COAGS: No results found for: \"PTT\", \"INR\", \"FIBR\"  POC: No results found for: \"BGM\", \"HCG\", \"HCGS\"  HEPATIC: No results found for: \"ALBUMIN\", \"PROTTOTAL\", \"ALT\", \"AST\", \"GGT\", \"ALKPHOS\", \"BILITOTAL\", \"BILIDIRECT\", \"LOU\"  OTHER: No results found for: \"PH\", \"LACT\", \"A1C\", \"DANY\", \"PHOS\", \"MAG\", \"LIPASE\", \"AMYLASE\", \"TSH\", \"T4\", \"T3\", \"CRP\", \"SED\"    Anesthesia Plan    ASA " "Status:  3       Anesthesia Type: General.     - Airway: ETT      Maintenance: Balanced.        Consents    Anesthesia Plan(s) and associated risks, benefits, and realistic alternatives discussed. Questions answered and patient/representative(s) expressed understanding.     - Discussed:     - Discussed with:  Patient            Postoperative Care    Pain management: IV analgesics, Oral pain medications.        Comments:               Ralph Michel DO    I have reviewed the pertinent notes and labs in the chart from the past 30 days and (re)examined the patient.  Any updates or changes from those notes are reflected in this note.              # Obesity: Estimated body mass index is 39.36 kg/m  as calculated from the following:    Height as of this encounter: 1.803 m (5' 11\").    Weight as of this encounter: 128 kg (282 lb 3 oz).      "

## 2024-06-05 NOTE — ANESTHESIA CARE TRANSFER NOTE
Patient: Stalin Fleming    Procedure: Procedure(s):  Holmium laser nucleation of the prostate and  cystoscopic injection of 100 units of Botox       Diagnosis: Benign prostatic hyperplasia with lower urinary tract symptoms, symptom details unspecified [N40.1]  Diagnosis Additional Information: No value filed.    Anesthesia Type:   General     Note:    Oropharynx: oropharynx clear of all foreign objects and spontaneously breathing  Level of Consciousness: awake and drowsy  Oxygen Supplementation: face mask  Level of Supplemental Oxygen (L/min / FiO2): 10  Independent Airway: airway patency satisfactory and stable  Dentition: dentition unchanged  Vital Signs Stable: post-procedure vital signs reviewed and stable  Report to RN Given: handoff report given  Patient transferred to: PACU    Handoff Report: Identifed the Patient, Identified the Reponsible Provider, Reviewed the pertinent medical history, Discussed the surgical course, Reviewed Intra-OP anesthesia mangement and issues during anesthesia, Set expectations for post-procedure period and Allowed opportunity for questions and acknowledgement of understanding      Vitals:  Vitals Value Taken Time   BP     Temp     Pulse 87 06/05/24 1033   Resp 21 06/05/24 1033   SpO2 98 % 06/05/24 1033   Vitals shown include unfiled device data.    Electronically Signed By: VIRGIL Morales CRNA  June 5, 2024  10:34 AM

## 2024-06-06 VITALS
WEIGHT: 282.19 LBS | HEART RATE: 88 BPM | DIASTOLIC BLOOD PRESSURE: 81 MMHG | TEMPERATURE: 98.3 F | HEIGHT: 71 IN | BODY MASS INDEX: 39.51 KG/M2 | SYSTOLIC BLOOD PRESSURE: 120 MMHG | RESPIRATION RATE: 20 BRPM | OXYGEN SATURATION: 90 %

## 2024-06-06 LAB
ANION GAP SERPL CALCULATED.3IONS-SCNC: 9 MMOL/L (ref 7–15)
BUN SERPL-MCNC: 11.2 MG/DL (ref 8–23)
CALCIUM SERPL-MCNC: 8.5 MG/DL (ref 8.8–10.2)
CHLORIDE SERPL-SCNC: 107 MMOL/L (ref 98–107)
CREAT SERPL-MCNC: 0.73 MG/DL (ref 0.67–1.17)
DEPRECATED HCO3 PLAS-SCNC: 26 MMOL/L (ref 22–29)
EGFRCR SERPLBLD CKD-EPI 2021: >90 ML/MIN/1.73M2
ERYTHROCYTE [DISTWIDTH] IN BLOOD BY AUTOMATED COUNT: 13.1 % (ref 10–15)
GLUCOSE SERPL-MCNC: 141 MG/DL (ref 70–99)
HCT VFR BLD AUTO: 43.7 % (ref 40–53)
HGB BLD-MCNC: 14.1 G/DL (ref 13.3–17.7)
MCH RBC QN AUTO: 29.1 PG (ref 26.5–33)
MCHC RBC AUTO-ENTMCNC: 32.3 G/DL (ref 31.5–36.5)
MCV RBC AUTO: 90 FL (ref 78–100)
PLATELET # BLD AUTO: 121 10E3/UL (ref 150–450)
POTASSIUM SERPL-SCNC: 3.8 MMOL/L (ref 3.4–5.3)
RBC # BLD AUTO: 4.84 10E6/UL (ref 4.4–5.9)
SODIUM SERPL-SCNC: 142 MMOL/L (ref 135–145)
WBC # BLD AUTO: 13 10E3/UL (ref 4–11)

## 2024-06-06 PROCEDURE — 36415 COLL VENOUS BLD VENIPUNCTURE: CPT

## 2024-06-06 PROCEDURE — 999N000157 HC STATISTIC RCP TIME EA 10 MIN

## 2024-06-06 PROCEDURE — 250N000013 HC RX MED GY IP 250 OP 250 PS 637

## 2024-06-06 PROCEDURE — 80048 BASIC METABOLIC PNL TOTAL CA: CPT

## 2024-06-06 PROCEDURE — 85027 COMPLETE CBC AUTOMATED: CPT

## 2024-06-06 PROCEDURE — 94640 AIRWAY INHALATION TREATMENT: CPT

## 2024-06-06 PROCEDURE — 258N000001 HC RX 258

## 2024-06-06 PROCEDURE — 250N000009 HC RX 250: Performed by: ANESTHESIOLOGY

## 2024-06-06 PROCEDURE — 250N000011 HC RX IP 250 OP 636: Performed by: STUDENT IN AN ORGANIZED HEALTH CARE EDUCATION/TRAINING PROGRAM

## 2024-06-06 PROCEDURE — 250N000011 HC RX IP 250 OP 636

## 2024-06-06 RX ORDER — FUROSEMIDE 10 MG/ML
20 INJECTION INTRAMUSCULAR; INTRAVENOUS ONCE
Status: CANCELLED | OUTPATIENT
Start: 2024-06-06

## 2024-06-06 RX ORDER — CEPHALEXIN 500 MG/1
500 CAPSULE ORAL 2 TIMES DAILY
Qty: 14 CAPSULE | Refills: 0 | Status: SHIPPED | OUTPATIENT
Start: 2024-06-06 | End: 2024-06-13

## 2024-06-06 RX ORDER — FUROSEMIDE 10 MG/ML
20 INJECTION INTRAMUSCULAR; INTRAVENOUS ONCE
Status: COMPLETED | OUTPATIENT
Start: 2024-06-06 | End: 2024-06-06

## 2024-06-06 RX ADMIN — BUPROPION HYDROCHLORIDE 150 MG: 150 TABLET, EXTENDED RELEASE ORAL at 10:55

## 2024-06-06 RX ADMIN — ROPINIROLE HYDROCHLORIDE 0.5 MG: 0.25 TABLET, FILM COATED ORAL at 10:55

## 2024-06-06 RX ADMIN — METHOCARBAMOL 750 MG: 750 TABLET ORAL at 10:55

## 2024-06-06 RX ADMIN — DOCUSATE SODIUM AND SENNOSIDES 1 TABLET: 8.6; 5 TABLET, FILM COATED ORAL at 10:56

## 2024-06-06 RX ADMIN — IPRATROPIUM BROMIDE AND ALBUTEROL SULFATE 3 ML: .5; 3 SOLUTION RESPIRATORY (INHALATION) at 12:14

## 2024-06-06 RX ADMIN — TAMSULOSIN HYDROCHLORIDE 0.4 MG: 0.4 CAPSULE ORAL at 10:57

## 2024-06-06 RX ADMIN — ACETAMINOPHEN 975 MG: 325 TABLET, FILM COATED ORAL at 13:30

## 2024-06-06 RX ADMIN — SODIUM CHLORIDE 3000 ML: 900 IRRIGANT IRRIGATION at 04:31

## 2024-06-06 RX ADMIN — AMPICILLIN SODIUM 2 G: 2 INJECTION, POWDER, FOR SOLUTION INTRAMUSCULAR; INTRAVENOUS at 01:19

## 2024-06-06 RX ADMIN — LEVOTHYROXINE SODIUM 224 MCG: 112 TABLET ORAL at 10:57

## 2024-06-06 RX ADMIN — IPRATROPIUM BROMIDE AND ALBUTEROL SULFATE 3 ML: .5; 3 SOLUTION RESPIRATORY (INHALATION) at 08:00

## 2024-06-06 RX ADMIN — FUROSEMIDE 20 MG: 10 INJECTION, SOLUTION INTRAMUSCULAR; INTRAVENOUS at 06:54

## 2024-06-06 RX ADMIN — ACETAMINOPHEN 975 MG: 325 TABLET, FILM COATED ORAL at 04:01

## 2024-06-06 RX ADMIN — OXYCODONE HYDROCHLORIDE 10 MG: 5 TABLET ORAL at 01:19

## 2024-06-06 ASSESSMENT — ACTIVITIES OF DAILY LIVING (ADL)
ADLS_ACUITY_SCORE: 28

## 2024-06-06 NOTE — DISCHARGE INSTRUCTIONS
"HoLEP    Activity  - No strenuous exercise for 6 weeks.  - No lifting, pushing, pulling more than 10 pounds for 6 weeks.  - Do not strain with bowel movements.  - Do not drive until you can press the brake pedal quickly and fully without pain.  - Do not operate a motor vehicle while taking narcotic pain medications.      Medications  - You should finish the antibiotic you were prescribed prior to the surgery.  - Transition from narcotic pain medications to tylenol (acetaminophen) as you are able.  Wean yourself off all pain medications as you are able.  - Some pain medications contain both tylenol (acetaminophen) and a narcotic (Norco, vicodin, percocet), do not take more than 4,000mg of Tylenol (acetaminophen) from all sources in any 24 hour period.  - Narcotics can make you constipated.  Take over the counter fiber (metamucil or benefiber) and stool softeners (miralax, docusate or senna) while taking narcotic pain medications, but stop if you develop diarrhea.  - No driving or operating machinery while taking narcotic pain medications    Follow-Up:  - Call your primary care provider to touch base regarding your recent admission.  - Call or return sooner than your regularly scheduled visit if you develop any of the following: fever (greater than 101.5), uncontrolled pain, uncontrolled nausea or vomiting, worsening blood in urine or inability to urinate as above.    Phone numbers:   - Monday through Friday 8am to 4:30pm: Call [phone] with questions, requests for medication refills, or to schedule or confirm an appointment.  - Nights or weekends: call the after hours emergency pager - [phone] and tell the  \"I would like to page the Urology Resident on call.\" Please note, due to prescribing laws, resident physicians are unable to prescribe narcotics after-hours. If you feel as though you will need narcotic pain medications, you will need to call the clinic during business hours OR seek emergency " care.  - For emergencies, call 911

## 2024-06-06 NOTE — PLAN OF CARE
Shift 0700 to 1433:    Pt A&Ox4, denies CP, nausea, vomiting, diarrhea, constipation, numbness, tingling, and pain. Pt independent. No LDAs. Regular diet, takes medications whole with thin liquids. Bladder scans were 47 mL and 79 mL, okayed to discharge by urology.    Pt discharged at 1433 to home, and left with personal belongings. Pt received complete discharge paperwork. Pt was given times of last dose for all discharge medications on discharge medication sheets. Discharge teaching included medication, pain management, activity restrictions, and signs and symptoms of infection. Pt to follow up with Dr. Kumar, Urology, September 3. Pt had no further questions at the time of discharge and no unmet needs were identified.

## 2024-06-06 NOTE — PROGRESS NOTES
"Urology  Progress Note    NAEO  Pain well controlled  Tolerating solid food - no n/v  Not ambulating  De Souza in place    Exam  BP (!) 140/82 (BP Location: Right arm)   Pulse 85   Temp 98.2  F (36.8  C) (Oral)   Resp 21   Ht 1.803 m (5' 11\")   Wt 128 kg (282 lb 3 oz)   SpO2 96%   BMI 39.36 kg/m    No acute distress  Unlabored breathing  Abdomen soft,  appropriately tender, nondistended.   De Souza with clear urine in tubing on a slow drip       UOP CBI    Labs  Recent Labs   Lab Test 06/05/24  1307   CR 0.68        AM labs pending    Assessment/Plan  73 year old male POD#1 s/p HoLEP and urethral botox for BPH and LUTS. Doing well. Plan for catheter removal and 20mg of IV lasix. Nursing to obtain PVR and message results.     - de souza removed this AM  - lasix 20mg IV  - nursing to message results of PVR (order placed)  - likely discharge later today    Seen and examined with the chief resident. Will discuss with Dr. Kumar.    Good Jett MD  Urology Resident PGY3     Contacting the Urology Team     Please use the following job codes to reach the Urology Team. Note that you must use an in house phone and that job codes cannot receive text pages.   On weekdays, dial [phone] (or star-star-star [phone] on the new [employer] telephones) then [phone] to reach the Adult Urology resident or PA on call  On weekdays, dial [phone] (or star-star-star [phone] on the new [employer] telephones) then [phone] to reach the Pediatric Urology resident  On weeknights and weekends, dial [phone] (or star-star-star [phone] on the new [employer] telephones) then [phone] to reach the Urology resident on call (for both Adult and Pediatrics)            "

## 2024-06-06 NOTE — PLAN OF CARE
Goal Outcome Evaluation:1900-0730. No acute changes this shift.  Pt A &O x4. Able to make needs known. Pt desatting on 3L at night now placed on 5L oxymask.  Denied SOB, CP, N/T. Reported pain 1-5/10, managed with prn oxycodone and Ice. Expiratory wheezing noted. Not oob this shift but repositioned left and Right. CBI draining color is light pink. R PIV SL. Rodriguez removed this morning by Urology. STAT Furosemide given. Plan of care continues.

## 2024-06-07 LAB
PATH REPORT.COMMENTS IMP SPEC: NORMAL
PATH REPORT.COMMENTS IMP SPEC: NORMAL
PATH REPORT.FINAL DX SPEC: NORMAL
PATH REPORT.GROSS SPEC: NORMAL
PATH REPORT.MICROSCOPIC SPEC OTHER STN: NORMAL
PATH REPORT.RELEVANT HX SPEC: NORMAL
PHOTO IMAGE: NORMAL

## 2024-06-19 ENCOUNTER — TELEPHONE (OUTPATIENT)
Dept: UROLOGY | Facility: CLINIC | Age: 74
End: 2024-06-19

## 2024-06-19 NOTE — TELEPHONE ENCOUNTER
Updated patient on pathology results.  He recently started blood thinners for a blood clot in his leg and has had blood in his urine.  [facility] and was checked out okay.  He has some soreness, no leaking, but does have the hematuria.  Provider updated.  Vianey Mohan RN

## 2024-08-20 ENCOUNTER — OFFICE VISIT (OUTPATIENT)
Dept: UROLOGY | Facility: CLINIC | Age: 74
End: 2024-08-20

## 2024-08-20 VITALS
HEART RATE: 79 BPM | BODY MASS INDEX: 39.2 KG/M2 | SYSTOLIC BLOOD PRESSURE: 136 MMHG | OXYGEN SATURATION: 94 % | DIASTOLIC BLOOD PRESSURE: 95 MMHG | HEIGHT: 71 IN | WEIGHT: 280 LBS

## 2024-08-20 DIAGNOSIS — N40.1 BPH WITH OBSTRUCTION/LOWER URINARY TRACT SYMPTOMS: Primary | ICD-10-CM

## 2024-08-20 DIAGNOSIS — N13.8 BPH WITH OBSTRUCTION/LOWER URINARY TRACT SYMPTOMS: Primary | ICD-10-CM

## 2024-08-20 LAB — RESIDUAL VOLUME (RV) (EXTERNAL): 34

## 2024-08-20 PROCEDURE — 99024 POSTOP FOLLOW-UP VISIT: CPT | Performed by: UROLOGY

## 2024-08-20 ASSESSMENT — PAIN SCALES - GENERAL: PAINLEVEL: NO PAIN (0)

## 2024-08-20 NOTE — PROGRESS NOTES
Assessment & Plan   ASSESSMENT and PLAN  73 year old male here for reevaluation of urinary symptoms after HoLEP.  Having some stress urinary incontinence since the procedure and has not been to pelvic floor physical therapy yet.  Did have a postoperative DVT managed with anticoagulation..    1.  BPH with urinary obstruction status post HoLEP  2.  Stress urinary incontinence  3.  Deep venous thrombosis, postoperative    [facility]  This should significantly help his urinary incontinence.  Discussed his risk factors including being overweight his one of them.  [facility].    Plan:  - Pelvic floor physical therapy  - [facility]      Time spent: 20 minutes spent on the date of the encounter doing chart review, history and exam, documentation and further activities as noted above.    Efe Kumar MD   Urology  [facility]         Subjective     CHIEF COMPLAINT   It was my pleasure to see Stalin Fleming  who is a 73 year old male for follow-up of HoLEP.      HPI   Stalin Fleming is a very pleasant 73 year old male who underwent a HoLEP and cystoscopic injection of 100 unit(s) botox on 6/5/2024.    His pathology was 75.8 g of benign tissue.    He was admitted overnight and had catheter removal on POD1 without retention.    Postoperatively, he did have any unplanned visits, ER visits, hospitalizations or unplanned procedures.    Patient did have a postoperative DVT.  He had symptoms on postoperative day 1 with some leg cramping And [facility] where he had some swelling of the leg in addition to cramping and was found to have an acute DVT.  He was started on anticoagulation therapy and will continue for 3 months.  Did have some hematuria after starting his anticoagulation however this is resolved.    Patient still having significant stress urinary incontinence utilizing  adult diapers.  Otherwise his flow is quite good and pleased with urination.

## 2024-08-20 NOTE — NURSING NOTE
Chief Complaint   Patient presents with    Benign Prostatic Hypertrophy    Patients post void residual was 34 ml. Patient c/o leakage upon movement.  Berenice George LPN

## 2024-08-20 NOTE — PATIENT INSTRUCTIONS
You need to get pelvic floor physical therapy to strengthen your sphincter to help your urinary leakage.  [facility] done through [facility].    You can follow-up with the [facility] or me, whichever is easier (or covered by the [facility]).

## 2024-08-20 NOTE — LETTER
8/20/2024       RE: Stalin Fleming  [address] [facility] [address]     Dear Colleague,    Thank you for referring your patient, Stalin Fleming, to the [facility] at [facility]. Please see a copy of my visit note below.    Assessment & Plan  ASSESSMENT and PLAN  73 year old male here for reevaluation of urinary symptoms after HoLEP.  Having some stress urinary incontinence since the procedure and has not been to pelvic floor physical therapy yet.  Did have a postoperative DVT managed with anticoagulation..    1.  BPH with urinary obstruction status post HoLEP  2.  Stress urinary incontinence  3.  Deep venous thrombosis, postoperative    Will have patient [facility]  This should significantly help his urinary incontinence.  Discussed his risk factors including being overweight his one of them.  He will [facility].    Plan:  - Pelvic floor physical therapy  - [facility]      Time spent: 20 minutes spent on the date of the encounter doing chart review, history and exam, documentation and further activities as noted above.    Efe Kumar MD   Urology  [facility]         Subjective    CHIEF COMPLAINT   It was my pleasure to see Stalin Fleming  who is a 73 year old male for follow-up of HoLEP.      HPI   Stalin Fleming is a very pleasant 73 year old male who underwent a HoLEP and cystoscopic injection of 100 unit(s) botox on 6/5/2024.    His pathology was 75.8 g of benign tissue.    He was admitted overnight and had catheter removal on POD1 without retention.    Postoperatively, he did have any unplanned visits, ER visits, hospitalizations or unplanned procedures.    Patient did have a postoperative DVT.  He had symptoms on postoperative day 1 with some leg cramping And [facility] where he had some swelling  of the leg in addition to cramping and was found to have an acute DVT.  He was started on anticoagulation therapy and will continue for 3 months.  Did have some hematuria after starting his anticoagulation however this is resolved.    Patient still having significant stress urinary incontinence utilizing adult diapers.  Otherwise his flow is quite good and pleased with urination.                Again, thank you for allowing me to participate in the care of your patient.      Sincerely,    Efe Kumar MD

## 2025-04-28 ENCOUNTER — LAB REQUISITION (OUTPATIENT)
Dept: LAB | Facility: CLINIC | Age: 75
End: 2025-04-28
Payer: MEDICARE

## 2025-04-28 DIAGNOSIS — I10 ESSENTIAL (PRIMARY) HYPERTENSION: ICD-10-CM

## 2025-04-28 DIAGNOSIS — E55.9 VITAMIN D DEFICIENCY, UNSPECIFIED: ICD-10-CM

## 2025-04-28 DIAGNOSIS — E03.9 HYPOTHYROIDISM, UNSPECIFIED: ICD-10-CM

## 2025-04-28 DIAGNOSIS — D64.9 ANEMIA, UNSPECIFIED: ICD-10-CM

## 2025-04-28 DIAGNOSIS — E11.00 TYPE 2 DIABETES MELLITUS WITH HYPEROSMOLARITY WITHOUT NONKETOTIC HYPERGLYCEMIC-HYPEROSMOLAR COMA (NKHHC) (H): ICD-10-CM

## 2025-04-30 LAB
ANION GAP SERPL CALCULATED.3IONS-SCNC: 11 MMOL/L (ref 7–15)
BUN SERPL-MCNC: 8 MG/DL (ref 8–23)
CALCIUM SERPL-MCNC: 8.2 MG/DL (ref 8.8–10.4)
CHLORIDE SERPL-SCNC: 105 MMOL/L (ref 98–107)
CREAT SERPL-MCNC: 0.73 MG/DL (ref 0.67–1.17)
EGFRCR SERPLBLD CKD-EPI 2021: >90 ML/MIN/1.73M2
ERYTHROCYTE [DISTWIDTH] IN BLOOD BY AUTOMATED COUNT: 13.4 % (ref 10–15)
EST. AVERAGE GLUCOSE BLD GHB EST-MCNC: 283 MG/DL
GLUCOSE SERPL-MCNC: 102 MG/DL (ref 70–99)
HBA1C MFR BLD: 11.5 %
HCO3 SERPL-SCNC: 22 MMOL/L (ref 22–29)
HCT VFR BLD AUTO: 40.5 % (ref 40–53)
HGB BLD-MCNC: 13 G/DL (ref 13.3–17.7)
MCH RBC QN AUTO: 29.4 PG (ref 26.5–33)
MCHC RBC AUTO-ENTMCNC: 32.1 G/DL (ref 31.5–36.5)
MCV RBC AUTO: 92 FL (ref 78–100)
PLATELET # BLD AUTO: 114 10E3/UL (ref 150–450)
POTASSIUM SERPL-SCNC: 3.8 MMOL/L (ref 3.4–5.3)
RBC # BLD AUTO: 4.42 10E6/UL (ref 4.4–5.9)
SODIUM SERPL-SCNC: 138 MMOL/L (ref 135–145)
T4 FREE SERPL-MCNC: 0.95 NG/DL (ref 0.9–1.7)
TSH SERPL DL<=0.005 MIU/L-ACNC: 9.34 UIU/ML (ref 0.3–4.2)
VIT D+METAB SERPL-MCNC: 33 NG/ML (ref 20–50)
WBC # BLD AUTO: 7.1 10E3/UL (ref 4–11)

## 2025-04-30 PROCEDURE — 80048 BASIC METABOLIC PNL TOTAL CA: CPT | Mod: ORL | Performed by: FAMILY MEDICINE

## 2025-04-30 PROCEDURE — P9603 ONE-WAY ALLOW PRORATED MILES: HCPCS | Mod: ORL | Performed by: FAMILY MEDICINE

## 2025-04-30 PROCEDURE — 80048 BASIC METABOLIC PNL TOTAL CA: CPT | Performed by: FAMILY MEDICINE

## 2025-04-30 PROCEDURE — 36415 COLL VENOUS BLD VENIPUNCTURE: CPT | Mod: ORL | Performed by: FAMILY MEDICINE

## 2025-04-30 PROCEDURE — 82306 VITAMIN D 25 HYDROXY: CPT | Mod: ORL | Performed by: FAMILY MEDICINE

## 2025-04-30 PROCEDURE — 84443 ASSAY THYROID STIM HORMONE: CPT | Mod: ORL | Performed by: FAMILY MEDICINE

## 2025-04-30 PROCEDURE — 83036 HEMOGLOBIN GLYCOSYLATED A1C: CPT | Mod: ORL | Performed by: FAMILY MEDICINE

## 2025-04-30 PROCEDURE — 84439 ASSAY OF FREE THYROXINE: CPT | Mod: ORL | Performed by: FAMILY MEDICINE

## 2025-04-30 PROCEDURE — 85027 COMPLETE CBC AUTOMATED: CPT | Mod: ORL | Performed by: FAMILY MEDICINE

## 2025-05-05 ENCOUNTER — LAB REQUISITION (OUTPATIENT)
Dept: LAB | Facility: CLINIC | Age: 75
End: 2025-05-05

## 2025-05-05 DIAGNOSIS — I10 ESSENTIAL (PRIMARY) HYPERTENSION: ICD-10-CM

## 2025-05-05 DIAGNOSIS — D64.9 ANEMIA, UNSPECIFIED: ICD-10-CM

## 2025-05-07 ENCOUNTER — LAB REQUISITION (OUTPATIENT)
Dept: LAB | Facility: CLINIC | Age: 75
End: 2025-05-07
Payer: MEDICARE

## 2025-05-07 DIAGNOSIS — N39.0 URINARY TRACT INFECTION, SITE NOT SPECIFIED: ICD-10-CM

## 2025-05-07 LAB
ALBUMIN UR-MCNC: 100 MG/DL
ANION GAP SERPL CALCULATED.3IONS-SCNC: 15 MMOL/L (ref 7–15)
APPEARANCE UR: ABNORMAL
BILIRUB UR QL STRIP: NEGATIVE
BUN SERPL-MCNC: 10.9 MG/DL (ref 8–23)
CALCIUM SERPL-MCNC: 9.9 MG/DL (ref 8.8–10.4)
CHLORIDE SERPL-SCNC: 105 MMOL/L (ref 98–107)
COLOR UR AUTO: YELLOW
CREAT SERPL-MCNC: 0.92 MG/DL (ref 0.67–1.17)
EGFRCR SERPLBLD CKD-EPI 2021: 87 ML/MIN/1.73M2
ERYTHROCYTE [DISTWIDTH] IN BLOOD BY AUTOMATED COUNT: 14 % (ref 10–15)
GLUCOSE SERPL-MCNC: 135 MG/DL (ref 70–99)
GLUCOSE UR STRIP-MCNC: NEGATIVE MG/DL
HCO3 SERPL-SCNC: 21 MMOL/L (ref 22–29)
HCT VFR BLD AUTO: 44.7 % (ref 40–53)
HGB BLD-MCNC: 14.3 G/DL (ref 13.3–17.7)
HGB UR QL STRIP: ABNORMAL
KETONES UR STRIP-MCNC: NEGATIVE MG/DL
LEUKOCYTE ESTERASE UR QL STRIP: ABNORMAL
MCH RBC QN AUTO: 29.4 PG (ref 26.5–33)
MCHC RBC AUTO-ENTMCNC: 32 G/DL (ref 31.5–36.5)
MCV RBC AUTO: 92 FL (ref 78–100)
MUCOUS THREADS #/AREA URNS LPF: PRESENT /LPF
NITRATE UR QL: NEGATIVE
PH UR STRIP: 8.5 [PH] (ref 5–7)
PLATELET # BLD AUTO: 184 10E3/UL (ref 150–450)
POTASSIUM SERPL-SCNC: 3.6 MMOL/L (ref 3.4–5.3)
RBC # BLD AUTO: 4.87 10E6/UL (ref 4.4–5.9)
RBC URINE: 37 /HPF
SODIUM SERPL-SCNC: 141 MMOL/L (ref 135–145)
SP GR UR STRIP: 1.02 (ref 1–1.03)
UROBILINOGEN UR STRIP-MCNC: 3 MG/DL
WBC # BLD AUTO: 14.9 10E3/UL (ref 4–11)
WBC URINE: 15 /HPF

## 2025-05-07 PROCEDURE — 81001 URINALYSIS AUTO W/SCOPE: CPT | Performed by: NURSE PRACTITIONER

## 2025-05-07 PROCEDURE — 85041 AUTOMATED RBC COUNT: CPT | Performed by: FAMILY MEDICINE

## 2025-05-07 PROCEDURE — 82310 ASSAY OF CALCIUM: CPT | Performed by: FAMILY MEDICINE

## 2025-05-07 PROCEDURE — P9604 ONE-WAY ALLOW PRORATED TRIP: HCPCS | Performed by: FAMILY MEDICINE

## 2025-05-07 PROCEDURE — 87086 URINE CULTURE/COLONY COUNT: CPT | Performed by: NURSE PRACTITIONER

## 2025-05-07 PROCEDURE — 36415 COLL VENOUS BLD VENIPUNCTURE: CPT | Performed by: FAMILY MEDICINE

## 2025-05-07 PROCEDURE — 82947 ASSAY GLUCOSE BLOOD QUANT: CPT | Performed by: FAMILY MEDICINE

## 2025-05-10 LAB
BACTERIA UR CULT: ABNORMAL
BACTERIA UR CULT: ABNORMAL

## (undated) DEVICE — PREP DYNA-HEX 4% CHG SCRUB 4OZ BOTTLE MDS098710

## (undated) DEVICE — SYR 10ML LL W/O NDL 302995

## (undated) DEVICE — SYR PISTON IRRIGATION 60 ML DYND20325

## (undated) DEVICE — CATH FOLEY 3WAY 22FR 30ML LATEX 0167SI22

## (undated) DEVICE — BAG URINARY DRAIN 4000ML LF 153509

## (undated) DEVICE — STRAP KNEE/BODY 31143004

## (undated) DEVICE — SOL NACL 0.9% IRRIG 3000ML BAG 2B7477

## (undated) DEVICE — PAD FLOOR SURGSAFE 30X40" 83030

## (undated) DEVICE — DRAPE MAYO STAND 23X54 8337

## (undated) DEVICE — TUBING EXTENSION SET 6" W/THREE-WAY STOPCOCK MX43660

## (undated) DEVICE — LINEN TOWEL PACK X5 5464

## (undated) DEVICE — GLOVE BIOGEL PI MICRO INDICATOR UNDERGLOVE SZ 7.5 48975

## (undated) DEVICE — SPECIMEN TRAP TISSUE CONTAINER PIRANHA 2208120

## (undated) DEVICE — PAD CHUX UNDERPAD 30X36" P3036C

## (undated) DEVICE — LASER FIBER HOLMIUM MOSES 550 D/F/L AC-10030120

## (undated) DEVICE — CATH URETERAL OPEN END 5FRX70CM M0064002010

## (undated) DEVICE — NDL 18GA 1.5" 305196

## (undated) DEVICE — SYR 50ML LL W/O NDL 309653

## (undated) DEVICE — CATH LASER URETERAL 7.1FRX40CM G17797  022403-7.1-40

## (undated) DEVICE — GLOVE BIOGEL PI MICRO SZ 7.0 48570

## (undated) DEVICE — LINEN GOWN X4 5410

## (undated) DEVICE — SUCTION MANIFOLD NEPTUNE 2 SYS 4 PORT 0702-020-000

## (undated) DEVICE — Device

## (undated) DEVICE — SOL NACL 0.9% IRRIG 1000ML BOTTLE 2F7124

## (undated) DEVICE — TUBING SET THERMEDX UROLOGY SGL USE LL0006

## (undated) DEVICE — BLADE MORCELLATOR WOLF PIRAHNA 4.75X335MM 49700113

## (undated) DEVICE — DEVICE CATH STABILIZATION STATLOCK FOLEY 3-WAY FOL0105

## (undated) DEVICE — SOL WATER IRRIG 3000ML BAG 2B7117

## (undated) DEVICE — ADAPTOR CHECK FLO 050805-TWSL

## (undated) DEVICE — DRSG ABDOMINAL 07 1/2X8" 7197D

## (undated) DEVICE — TUBING SET PIRANHA 41702208

## (undated) DEVICE — SOL WATER IRRIG 1000ML BOTTLE 2F7114

## (undated) DEVICE — NEEDLE HYPO MONOJECT 22GA 1.15IN BLUE 8881250206

## (undated) DEVICE — NDL WOLF ASPIRATING INJECT 22GA 31.25CM 8652.7775

## (undated) DEVICE — DRSG GAUZE 4X8" NON21842

## (undated) DEVICE — FILTER PIRANHA DISP 2228.901

## (undated) RX ORDER — FENTANYL CITRATE 50 UG/ML
INJECTION, SOLUTION INTRAMUSCULAR; INTRAVENOUS
Status: DISPENSED
Start: 2024-06-05

## (undated) RX ORDER — EPHEDRINE SULFATE 50 MG/ML
INJECTION, SOLUTION INTRAMUSCULAR; INTRAVENOUS; SUBCUTANEOUS
Status: DISPENSED
Start: 2024-06-05

## (undated) RX ORDER — IPRATROPIUM BROMIDE AND ALBUTEROL SULFATE 2.5; .5 MG/3ML; MG/3ML
SOLUTION RESPIRATORY (INHALATION)
Status: DISPENSED
Start: 2024-06-05

## (undated) RX ORDER — ONDANSETRON 2 MG/ML
INJECTION INTRAMUSCULAR; INTRAVENOUS
Status: DISPENSED
Start: 2024-06-05

## (undated) RX ORDER — DEXAMETHASONE SODIUM PHOSPHATE 4 MG/ML
INJECTION, SOLUTION INTRA-ARTICULAR; INTRALESIONAL; INTRAMUSCULAR; INTRAVENOUS; SOFT TISSUE
Status: DISPENSED
Start: 2024-06-05

## (undated) RX ORDER — PROPOFOL 10 MG/ML
INJECTION, EMULSION INTRAVENOUS
Status: DISPENSED
Start: 2024-06-05

## (undated) RX ORDER — AMPICILLIN 2 G/1
INJECTION, POWDER, FOR SOLUTION INTRAVENOUS
Status: DISPENSED
Start: 2024-06-05

## (undated) RX ORDER — FENTANYL CITRATE-0.9 % NACL/PF 10 MCG/ML
PLASTIC BAG, INJECTION (ML) INTRAVENOUS
Status: DISPENSED
Start: 2024-06-05